# Patient Record
Sex: FEMALE | Race: WHITE | Employment: FULL TIME | URBAN - METROPOLITAN AREA
[De-identification: names, ages, dates, MRNs, and addresses within clinical notes are randomized per-mention and may not be internally consistent; named-entity substitution may affect disease eponyms.]

---

## 2018-11-15 ENCOUNTER — HOSPITAL ENCOUNTER (EMERGENCY)
Age: 48
Discharge: HOME | End: 2018-11-15
Payer: COMMERCIAL

## 2018-11-15 VITALS
HEART RATE: 74 BPM | HEIGHT: 66 IN | RESPIRATION RATE: 16 BRPM | OXYGEN SATURATION: 99 % | TEMPERATURE: 97.7 F | SYSTOLIC BLOOD PRESSURE: 115 MMHG | BODY MASS INDEX: 36.96 KG/M2 | WEIGHT: 230 LBS | DIASTOLIC BLOOD PRESSURE: 72 MMHG

## 2018-11-15 DIAGNOSIS — S86.812A STRAIN OF CALF MUSCLE, LEFT, INITIAL ENCOUNTER: Primary | ICD-10-CM

## 2018-11-15 PROCEDURE — 93971 EXTREMITY STUDY: CPT

## 2018-11-15 PROCEDURE — 99284 EMERGENCY DEPT VISIT MOD MDM: CPT

## 2018-11-15 RX ORDER — PREDNISONE 20 MG/1
40 TABLET ORAL DAILY
Qty: 10 TABLET | Refills: 0 | Status: SHIPPED | OUTPATIENT
Start: 2018-11-15 | End: 2018-11-20

## 2018-11-15 ASSESSMENT — PAIN SCALES - GENERAL
PAINLEVEL_OUTOF10: 6
PAINLEVEL_OUTOF10: 3

## 2018-11-15 NOTE — ED NOTES
Pt scripts x1 instructed to follow up with PCP. Assessed per Mat Pro PA.      Reji Jensen LPN  [id number]

## 2018-11-15 NOTE — ED PROVIDER NOTES
needed for Sleep Px by Dr. Simón Nielson LORazepam (ATIVAN) 0.5 MG tablet Take 0.5 mg by mouth 2 times daily as needed for Anxiety.  albuterol (PROAIR HFA) 108 (90 BASE) MCG/ACT inhaler Inhale 2 puffs into the lungs every 4 hours as needed for Wheezing 1 Inhaler 3     Allergies   Allergen Reactions    Acetaminophen Other (See Comments)     \"makes me jumpy\"    Amoxicillin Hives    Ibuprofen Hives       REVIEW OF SYSTEMS  10 systems reviewed, pertinent positives per HPI otherwise noted to be negative    PHYSICAL EXAM  /72   Pulse 74   Temp 97.7 °F (36.5 °C) (Oral)   Resp 16   Ht 5' 6\" (1.676 m)   Wt 230 lb (104.3 kg)   LMP 04/13/2015   SpO2 99%   BMI 37.12 kg/m²   GENERAL APPEARANCE: Awake and alert. Cooperative. No acute distress. HEAD: Normocephalic. Atraumatic. EYES: PERRL. EOM's grossly intact. ENT: Mucous membranes are moist.   NECK: Supple. HEART: RRR. No murmurs. LUNGS: Respirations unlabored. CTAB. Good air exchange. Speaking comfortably in full sentences. ABDOMEN: Soft. Non-distended. Non-tender. No guarding or rebound. EXTREMITIES: No peripheral edema. Patient exhibits left calf tenderness without palpable cords or masses. No unilateral swelling. No redness or warmth. Normal range of motion and strength testing. Pedal pulses +2 and cap refill less than 5 seconds. Moves all extremities equally. All extremities neurovascularly intact. SKIN: Warm and dry. No acute rashes. NEUROLOGICAL: Alert and oriented. CN's 2-12 intact. No gross facial drooping. Strength 5/5, sensation intact. PSYCHIATRIC: Normal mood and affect.     RADIOLOGY  Vl Extremity Venous Left    Result Date: 11/15/2018  Vascular Lower Extremities DVT Study Procedure -- PRELIMINARY SONOGRAPHER REPORT --   Demographics   Patient Name      Dulce BARRIOS   Date of Study     11/15/2018          Gender              Female   Patient Number    [id number]          Date of Birth       1970   Visit Catia Andrew and I have discussed the diagnosis and risks, and we agree with discharging home to follow-up with their primary doctor or the referral orthopedist. We also discussed returning to the Emergency Department immediately if new or worsening symptoms occur. We have discussed the symptoms which are most concerning (e.g., changing or worsening pain, numbness, weakness) that necessitate immediate return. Final Impression  1. Strain of calf muscle, left, initial encounter      Blood pressure 115/72, pulse 74, temperature 97.7 °F (36.5 °C), temperature source Oral, resp. rate 16, height 5' 6\" (1.676 m), weight 230 lb (104.3 kg), last menstrual period 04/13/2015, SpO2 99 %. DISPOSITION  Patient was discharged to home in good condition.           Jesus Salas  11/15/18 [id number]

## 2018-12-01 ENCOUNTER — OFFICE VISIT (OUTPATIENT)
Dept: ORTHOPEDIC SURGERY | Age: 48
End: 2018-12-01
Payer: COMMERCIAL

## 2018-12-01 VITALS — BODY MASS INDEX: 36.96 KG/M2 | WEIGHT: 230 LBS | HEIGHT: 66 IN

## 2018-12-01 DIAGNOSIS — M79.605 LEG PAIN, LEFT: Primary | ICD-10-CM

## 2018-12-01 DIAGNOSIS — S86.812A STRAIN OF CALF MUSCLE, LEFT, INITIAL ENCOUNTER: ICD-10-CM

## 2018-12-01 PROCEDURE — 99203 OFFICE O/P NEW LOW 30 MIN: CPT | Performed by: NURSE PRACTITIONER

## 2018-12-01 RX ORDER — METHYLPREDNISOLONE 4 MG/1
TABLET ORAL
Qty: 1 KIT | Refills: 0 | Status: SHIPPED | OUTPATIENT
Start: 2018-12-01 | End: 2018-12-07

## 2018-12-01 NOTE — PROGRESS NOTES
media tab  Past Medical History:   Diagnosis Date    Allergic rhinitis     Anxiety     Depression     GERD (gastroesophageal reflux disease)     Hiatal hernia       Past Surgical History:     History reviewed. No pertinent surgical history. Current Medications:     Current Outpatient Prescriptions:     zolpidem (AMBIEN) 10 MG tablet, Take 5 mg by mouth nightly as needed for Sleep Px by Dr. Vaishali Argueta, Disp: , Rfl:     albuterol (PROAIR HFA) 108 (90 BASE) MCG/ACT inhaler, Inhale 2 puffs into the lungs every 4 hours as needed for Wheezing, Disp: 1 Inhaler, Rfl: 3    LORazepam (ATIVAN) 0.5 MG tablet, Take 0.5 mg by mouth 2 times daily as needed for Anxiety. , Disp: , Rfl:   Allergies:  Acetaminophen; Amoxicillin; and Ibuprofen  Social History:    reports that she quit smoking about 18 years ago. She has never used smokeless tobacco. She reports that she does not drink alcohol or use drugs. Family History:   History reviewed. No pertinent family history. REVIEW OF SYSTEMS:   For new problems, a full review of systems will be found scanned in the patient's chart. CONSTITUTIONAL: Denies unexplained weight loss, fevers, chills   NEUROLOGICAL: Denies unsteady gait or progressive weakness  SKIN: Denies skin changes, delayed healing, rash, itching       PHYSICAL EXAM:    Vitals: Height 5' 6\" (1.676 m), weight 230 lb (104.3 kg), last menstrual period 04/13/2015. GENERAL EXAM:  · General Apparence: Patient is adequately groomed with no evidence of malnutrition. · Orientation: The patient is oriented to time, place and person. · Mood & Affect:The patient's mood and affect are appropriate       Left calf PHYSICAL EXAMINATION:  · Inspection:  No visual deformity. No erythema, ecchymosis or effusion. · Palpation:  Tenderness palpation mid calf around gastroc muscle. · Range of Motion: Left knee full range of motion with crepitance. · Strength: no strength deficits    · Special Tests:  Negative Homans.

## 2019-04-30 ENCOUNTER — OFFICE VISIT (OUTPATIENT)
Dept: FAMILY MEDICINE CLINIC | Age: 49
End: 2019-04-30
Payer: COMMERCIAL

## 2019-04-30 VITALS
BODY MASS INDEX: 40.02 KG/M2 | OXYGEN SATURATION: 98 % | HEART RATE: 83 BPM | HEIGHT: 66 IN | SYSTOLIC BLOOD PRESSURE: 110 MMHG | WEIGHT: 249 LBS | DIASTOLIC BLOOD PRESSURE: 80 MMHG

## 2019-04-30 DIAGNOSIS — F33.1 MODERATE EPISODE OF RECURRENT MAJOR DEPRESSIVE DISORDER (HCC): Primary | ICD-10-CM

## 2019-04-30 DIAGNOSIS — Z76.89 ENCOUNTER TO ESTABLISH CARE: ICD-10-CM

## 2019-04-30 DIAGNOSIS — F41.1 GAD (GENERALIZED ANXIETY DISORDER): ICD-10-CM

## 2019-04-30 DIAGNOSIS — G47.00 INSOMNIA, UNSPECIFIED TYPE: ICD-10-CM

## 2019-04-30 PROCEDURE — 99203 OFFICE O/P NEW LOW 30 MIN: CPT | Performed by: PHYSICIAN ASSISTANT

## 2019-04-30 RX ORDER — DULOXETIN HYDROCHLORIDE 30 MG/1
CAPSULE, DELAYED RELEASE ORAL
Refills: 0 | COMMUNITY
Start: 2019-04-23 | End: 2020-05-26

## 2019-04-30 ASSESSMENT — ENCOUNTER SYMPTOMS: SHORTNESS OF BREATH: 0

## 2019-04-30 NOTE — PROGRESS NOTES
2019    Gadiel Ernandez (:  1970) is a 50 y.o. female, here for evaluation of the following medical concerns:    HPI  Mood Disorder:  Patient presents for follow-up of depression and anxiety disorder. Current complaints include: anhedonia, feelings of hopelessness, feelings of worthlessness/excessive guilt, hypersomnia, fatigue, excessive worry and restlessness. She denies any other symptoms. Symptoms/signs of ankur: none. External stressors: nothing new. Current treatment includes: Cymbalta- 30 mg, benzodiazepine- ativan 0.5mg BID and sleep aid- Ambien 10 mg. Medication side effects: none. Would like a therapist.     Exercise: walks regularly  Diet: low caffeine consumption  Post-menopausal   has health screening. Tdap- had fever with the last one      Review of Systems   Constitutional: Positive for fatigue. Negative for activity change, appetite change, diaphoresis and unexpected weight change. Eyes: Negative for visual disturbance. Respiratory: Negative for shortness of breath. Cardiovascular: Negative for chest pain and palpitations. Skin: Negative for pallor. Neurological: Negative for dizziness, weakness, light-headedness and headaches. Hematological: Negative for adenopathy. Psychiatric/Behavioral: Positive for decreased concentration, dysphoric mood and sleep disturbance. Negative for agitation, behavioral problems, confusion, hallucinations, self-injury and suicidal ideas. The patient is nervous/anxious. The patient is not hyperactive. Prior to Visit Medications    Medication Sig Taking? Authorizing Provider   DULoxetine (CYMBALTA) 30 MG extended release capsule TAKE 1 CAPSULE BY MOUTH TWO TIMES A DAY Yes Historical Provider, MD   zolpidem (AMBIEN) 10 MG tablet Take 5 mg by mouth nightly as needed for Sleep Px by Dr. Ananya Kenney Yes Historical Provider, MD   LORazepam (ATIVAN) 0.5 MG tablet Take 0.5 mg by mouth 2 times daily as needed for Anxiety.  Yes Vitals:    04/30/19 1654   BP: 110/80   Site: Left Upper Arm   Position: Sitting   Cuff Size: Large Adult   Pulse: 83   SpO2: 98%   Weight: 249 lb (112.9 kg)   Height: 5' 6\" (1.676 m)     Estimated body mass index is 40.19 kg/m² as calculated from the following:    Height as of this encounter: 5' 6\" (1.676 m). Weight as of this encounter: 249 lb (112.9 kg). Physical Exam   Constitutional: She is oriented to person, place, and time. She appears well-developed and well-nourished. HENT:   Head: Normocephalic and atraumatic. Mouth/Throat: Oropharynx is clear and moist.   Cardiovascular: Normal rate, regular rhythm and normal heart sounds. Pulmonary/Chest: Effort normal and breath sounds normal.   Neurological: She is alert and oriented to person, place, and time. Skin: No pallor. Psychiatric: Her speech is normal and behavior is normal. Judgment and thought content normal. Her mood appears anxious. Cognition and memory are normal.   Vitals reviewed. ASSESSMENT/PLAN:  1. Moderate episode of recurrent major depressive disorder (HCC)  -  Continue with Cymbalta and ativan. Pt seen by Dr. Joselo Boyer  -  Follow up with [facility]    2. KAREN (generalized anxiety disorder)  -  See above. 3. Insomnia, unspecified type  -  Ambien managed by Dr. Joselo Boeyr. Follow up with [facility]. Return for August for gynecological exam/ schedule with [facility] . An  electronic signature was used to authenticate this note.     --VU Villalobos on 4/30/2019 at 5:22 PM

## 2019-05-22 ENCOUNTER — OFFICE VISIT (OUTPATIENT)
Dept: PSYCHOLOGY | Age: 49
End: 2019-05-22
Payer: COMMERCIAL

## 2019-05-22 DIAGNOSIS — F51.01 PRIMARY INSOMNIA: ICD-10-CM

## 2019-05-22 DIAGNOSIS — F33.1 MODERATE EPISODE OF RECURRENT MAJOR DEPRESSIVE DISORDER (HCC): Primary | ICD-10-CM

## 2019-05-22 DIAGNOSIS — F41.1 GAD (GENERALIZED ANXIETY DISORDER): ICD-10-CM

## 2019-05-22 PROCEDURE — 90791 PSYCH DIAGNOSTIC EVALUATION: CPT | Performed by: PSYCHOLOGIST

## 2019-05-22 ASSESSMENT — PATIENT HEALTH QUESTIONNAIRE - PHQ9
8. MOVING OR SPEAKING SO SLOWLY THAT OTHER PEOPLE COULD HAVE NOTICED. OR THE OPPOSITE, BEING SO FIGETY OR RESTLESS THAT YOU HAVE BEEN MOVING AROUND A LOT MORE THAN USUAL: 1
1. LITTLE INTEREST OR PLEASURE IN DOING THINGS: 1
9. THOUGHTS THAT YOU WOULD BE BETTER OFF DEAD, OR OF HURTING YOURSELF: 0
3. TROUBLE FALLING OR STAYING ASLEEP: 2
6. FEELING BAD ABOUT YOURSELF - OR THAT YOU ARE A FAILURE OR HAVE LET YOURSELF OR YOUR FAMILY DOWN: 1
10. IF YOU CHECKED OFF ANY PROBLEMS, HOW DIFFICULT HAVE THESE PROBLEMS MADE IT FOR YOU TO DO YOUR WORK, TAKE CARE OF THINGS AT HOME, OR GET ALONG WITH OTHER PEOPLE: 1
4. FEELING TIRED OR HAVING LITTLE ENERGY: 1
2. FEELING DOWN, DEPRESSED OR HOPELESS: 1
SUM OF ALL RESPONSES TO PHQ QUESTIONS 1-9: 9
5. POOR APPETITE OR OVEREATING: 1
SUM OF ALL RESPONSES TO PHQ QUESTIONS 1-9: 9
SUM OF ALL RESPONSES TO PHQ9 QUESTIONS 1 & 2: 2
7. TROUBLE CONCENTRATING ON THINGS, SUCH AS READING THE NEWSPAPER OR WATCHING TELEVISION: 1

## 2019-05-22 ASSESSMENT — ANXIETY QUESTIONNAIRES
5. BEING SO RESTLESS THAT IT IS HARD TO SIT STILL: 0-NOT AT ALL SURE
3. WORRYING TOO MUCH ABOUT DIFFERENT THINGS: 1-SEVERAL DAYS
4. TROUBLE RELAXING: 0-NOT AT ALL SURE
2. NOT BEING ABLE TO STOP OR CONTROL WORRYING: 1-SEVERAL DAYS
6. BECOMING EASILY ANNOYED OR IRRITABLE: 1-SEVERAL DAYS
GAD7 TOTAL SCORE: 4
1. FEELING NERVOUS, ANXIOUS, OR ON EDGE: 1-SEVERAL DAYS
7. FEELING AFRAID AS IF SOMETHING AWFUL MIGHT HAPPEN: 0-NOT AT ALL SURE

## 2019-05-22 NOTE — Clinical Note
She currently sees Dr. Jackson Elmore and pays out of pocket. We discussed seeing a psychiatrist covered by insurance vs having you maintain her meds. Do you have a preference?

## 2019-05-22 NOTE — PROGRESS NOTES
Behavioral Health Consultation  [address] Robertson PsyD  Psychologist  5/22/2019  8:06 AM      Time spent with Patient: 30 minutes  This is patient's first [facility] appointment. Reason for Consult:  Depression, anxiety, insomnia  Referring Provider: VU Berriosjose david Roman [phone]  Code for [place], [address]    Pt provided informed consent for the behavioral health program. Discussed with patient model of service to include the limits of confidentiality (i.e. abuse reporting, suicide intervention, etc.) and short-term intervention focused approach. Pt indicated understanding. Feedback given to PCP. S:  Has needed to be on antidepressants for years for depression and gets anxiety too. Was on Viibryrd for 5 years then recently switched. Sees a psychiatrist but is out of pocket cost. Wants to possibly switch. Since April 18 - has been on medical leave. Scheduled to go back to work Friday and feels ready. Dr. Denise Kam is her psychiatrist. Has been seeing her for 5 years. Does accounting work. Cymbalta has been going \"ok. \" Feels better being on it vs nothing when got off Viibryd. Has tried prozac in the past.     Goals to be less short fused. Gets easily irritable. Also wants to also learn to manage her time better. Puts too much on her plate at home and if doesn't get it done gets anxious. Also bored at work so wants to figure out how to get through her day. Stays for the good benefits but over the past year hasn't felt challenged or interested in work. Some changes with leadership so she now has less leadership. Also a coworker goes above and beyond and leaves her little to do. Thinks she may want to make a change, but is scared. Takes Ambien for sleep. Takes Ativan \"sometimes\" for sleep but not during the day.      O:  MSE:    Appearance    alert, cooperative  Sleep disturbance Yes  Fatigue Yes  Loss of pleasure Yes  Impulsive behavior No  Speech    normal rate and normal volume  Mood \"anxious\"  Affect     Congruent to thought content and mood; tearful at times  Thought Content    intact  Thought Process    linear and goal directed  Associations    logical connections  Insight    Good  Judgment    Intact  Orientation    oriented to person, place, time, and general circumstances  Memory    recent and remote memory intact  Attention/Concentration    intact  Ability to understand instructions Yes  Ability to respond meaningfully Yes  Suicide Assessment    Denies SI      History:    Medications:   Current Outpatient Medications   Medication Sig Dispense Refill    DULoxetine (CYMBALTA) 30 MG extended release capsule TAKE 1 CAPSULE BY MOUTH TWO TIMES A DAY  0    zolpidem (AMBIEN) 10 MG tablet Take 5 mg by mouth nightly as needed for Sleep Px by Dr. Odalys Marie      LORazepam (ATIVAN) 0.5 MG tablet Take 0.5 mg by mouth 2 times daily as needed for Anxiety. No current facility-administered medications for this visit.         Social History:   Social History     Socioeconomic History    Marital status:      Spouse name: Not on file    Number of children: Not on file    Years of education: Not on file    Highest education level: Not on file   Occupational History    Not on file   Social Needs    Financial resource strain: Not on file    Food insecurity:     Worry: Not on file     Inability: Not on file    Transportation needs:     Medical: Not on file     Non-medical: Not on file   Tobacco Use    Smoking status: Former Smoker     Last attempt to quit: 2000     Years since quittin.4    Smokeless tobacco: Never Used   Substance and Sexual Activity    Alcohol use: No    Drug use: No    Sexual activity: Yes     Partners: Male   Lifestyle    Physical activity:     Days per week: Not on file     Minutes per session: Not on file    Stress: Not on file   Relationships    Social connections:     Talks on phone: Not on file     Gets together: Not on file     Attends Advent service: Not on file     Active member of club or organization: Not on file     Attends meetings of clubs or organizations: Not on file     Relationship status: Not on file    Intimate partner violence:     Fear of current or ex partner: Not on file     Emotionally abused: Not on file     Physically abused: Not on file     Forced sexual activity: Not on file   Other Topics Concern    Not on file   Social History Narrative    Not on file       TOBACCO:   reports that she quit smoking about 19 years ago. She has never used smokeless tobacco.  ETOH:   reports that she does not drink alcohol. Family History:   Family History   Problem Relation Age of Onset    Cancer Mother         cervical cancer         A:  Talya Wells has been struggling with anxiety and depression for the past few years and has been working with a psychiatrist, Dr. Bonita Zeng fo medication management. She is interested in therapy to more effectively manage her mood. She was active and engaged and responded well to behavioral interventions. PHQ Scores 5/22/2019   PHQ2 Score 2   PHQ9 Score 9     Interpretation of Total Score Depression Severity: 1-4 = Minimal depression, 5-9 = Mild depression, 10-14 = Moderate depression, 15-19 = Moderately severe depression, 20-27 = Severe depression    KAREN 7 SCORE 5/22/2019   KAREN-7 Total Score 4     Interpretation of KAREN-7 score: 5-9 = mild anxiety, 10-14 = moderate anxiety, 15+ = severe anxiety. Recommend referral to behavioral health for scores 10 or greater.       Diagnosis:    KAREN  MDD, Recurrent, Moderate  Insomnia    Plan:  Pt interventions:  Established rapport, Conducted functional assessment, Trevor-setting to identify pt's primary goals for [facility] visit / overall health, Supportive techniques, Emphasized self-care as important for managing overall health and Provided Psychoeducation re: depression, anxiety, treatment available, insomnia, treatment available, role of medication in treatment   ACT interventions focused on values, provided handout on improving sleep through behavior change, treatment planning    Pt Behavioral Change Plan:  Pt set goals to 1) think about your goals and prioritize for next visit 2) review handout with behavioral changes for improved sleep 3) return for f/u in 2 weeks

## 2019-05-22 NOTE — PATIENT INSTRUCTIONS
Improving Sleep Through Behavior Change     Stimulus Control Procedures     Go to Bed Only When You Are Sleepy   The longer you are in bed awake, the more the bed is associated with a place to be awake instead of asleep. Delay bedtime until sleepy. Don't get into bed just because it's \"bedtime. \"    Get Out of Bed If You Cant Fall Asleep after 30 minutes OR  Get out of bed if you awaken during the night and can't fall back asleep after 20 minutes   Don't lie in bed trying to sleep. Instead, get out of bed and engage in a relaxing, quiet activity (e.g. Reading) until you feel DROWSY  then return to bed to attempt to sleep again. Use the Bed for Sleep and Sex Only   Do not watch TV, listen to the radio, eat, or read in your bed or bedroom. Sleep Hygiene Guidelines   Caffeine   Avoid caffeine 6 to 8 hours before bedtime. Caffeine disturbs sleep. Thus, drinking caffeinated beverages should be avoided near bedtime. Nicotine   Avoid nicotine before bedtime. Nicotine can keep you awake. Avoid tobacco near bedtime and during the night. Alcohol   Avoid alcohol after dinner. Alcohol often promotes the onset of sleep, but interrupts your natural sleep pattern. Do not consume it any closer than 4 hours before going to bed. Sleeping Pills   Sleep medications are effective only temporarily. Sleep medications lose their effectiveness in about 2 to 4 weeks when taken regularly. Over time, sleeping pills actually can make sleep problems worse; withdrawal from the medication can lead to an insomnia rebound. Keep use of sleeping pills infrequent, but dont worry if you need to use one on an occasional basis. Regular Exercise   Do not exercise within 3 hours of bedtime. Exercise within 3 hours of sleep may interfere with your ability to fall asleep due to body temperature changes. Baths/Showers  Baths can be a helpful relaxation activity. However, take the bath about 2 hours of bedtime.  This, too, can

## 2019-08-30 ENCOUNTER — OFFICE VISIT (OUTPATIENT)
Dept: FAMILY MEDICINE CLINIC | Age: 49
End: 2019-08-30
Payer: COMMERCIAL

## 2019-08-30 VITALS
WEIGHT: 252 LBS | DIASTOLIC BLOOD PRESSURE: 80 MMHG | SYSTOLIC BLOOD PRESSURE: 100 MMHG | OXYGEN SATURATION: 97 % | HEART RATE: 86 BPM | BODY MASS INDEX: 40.67 KG/M2

## 2019-08-30 DIAGNOSIS — N94.10 DYSPAREUNIA IN FEMALE: Primary | ICD-10-CM

## 2019-08-30 DIAGNOSIS — Z12.4 CERVICAL CANCER SCREENING: ICD-10-CM

## 2019-08-30 PROCEDURE — 99212 OFFICE O/P EST SF 10 MIN: CPT | Performed by: PHYSICIAN ASSISTANT

## 2019-08-31 LAB
CANDIDA SPECIES, DNA PROBE: NORMAL
GARDNERELLA VAGINALIS, DNA PROBE: NORMAL
TRICHOMONAS VAGINALIS DNA: NORMAL

## 2019-09-13 ENCOUNTER — OFFICE VISIT (OUTPATIENT)
Dept: OBGYN CLINIC | Age: 49
End: 2019-09-13
Payer: COMMERCIAL

## 2019-09-13 VITALS
DIASTOLIC BLOOD PRESSURE: 74 MMHG | WEIGHT: 253.6 LBS | HEIGHT: 65 IN | TEMPERATURE: 98 F | BODY MASS INDEX: 42.25 KG/M2 | SYSTOLIC BLOOD PRESSURE: 110 MMHG | HEART RATE: 69 BPM

## 2019-09-13 DIAGNOSIS — D68.52 PROTHROMBIN GENE MUTATION (HCC): ICD-10-CM

## 2019-09-13 DIAGNOSIS — N94.10 DYSPAREUNIA IN FEMALE: ICD-10-CM

## 2019-09-13 DIAGNOSIS — N95.0 POSTMENOPAUSAL POSTCOITAL BLEEDING: ICD-10-CM

## 2019-09-13 DIAGNOSIS — D68.51 HOMOZYGOUS FACTOR V LEIDEN MUTATION (HCC): ICD-10-CM

## 2019-09-13 DIAGNOSIS — Z12.4 PAP SMEAR FOR CERVICAL CANCER SCREENING: ICD-10-CM

## 2019-09-13 DIAGNOSIS — Z01.411 ENCNTR FOR GYN EXAM (GENERAL) (ROUTINE) W ABNORMAL FINDINGS: Primary | ICD-10-CM

## 2019-09-13 DIAGNOSIS — N95.2 VAGINAL ATROPHY: ICD-10-CM

## 2019-09-13 DIAGNOSIS — N93.0 POSTMENOPAUSAL POSTCOITAL BLEEDING: ICD-10-CM

## 2019-09-13 DIAGNOSIS — Z12.39 BREAST CANCER SCREENING: ICD-10-CM

## 2019-09-13 PROCEDURE — 99386 PREV VISIT NEW AGE 40-64: CPT | Performed by: OBSTETRICS & GYNECOLOGY

## 2019-09-13 NOTE — PROGRESS NOTES
Annual Exam      CC:   Chief Complaint   Patient presents with    Gynecologic Exam       HPI:  52 y.o. [id number] presents for her gynecologic annual exam.    Patient seen and examined. Patient is overall doing well. Patient was seen recently for her annual exam and they were unable to complete her pap smear secondary to non-visualization of her cervix. Patient reports last menses was 18 months ago. Denies bleeding since that time, however reports blood noted on her 's leg following intercourse. Reports intercourse has been painful for the last 3 months and was particularly painful at that time. Reports dryness and redness. Patient reports hot flashes daily, however feels that they are manageable. Patient reports breast exam with her PCP 2 weeks ago and declines today. In 2012 the patient had a blood clot in her right upper extremity. She was treated with blood thinners for 6 months. At that time testing was performed was positive for homozygous factor V Leiden and heterozygous for Prothrombin gene mutations. Health Maintenance:  Birth control: Post-menopausal / Vasectomy  Pregnancy plans: None  Safe relationship: Yes -  17 years    Screening:  Last pap smear: August 2014 - Normal   History of abnormal pap smears: Denies  Mammogram: February 2014 - normal, Denies hx of abnormal's     Review of Systems:   Review of Systems   Constitutional: Negative for chills and fever. HENT: Negative for congestion, sneezing and sore throat. Respiratory: Negative for cough and shortness of breath. Cardiovascular: Negative for chest pain and palpitations. Gastrointestinal: Negative for abdominal pain, constipation, diarrhea, nausea and vomiting. Endocrine:        Hot flashes   Genitourinary: Positive for dyspareunia, vaginal bleeding and vaginal pain. Negative for dysuria, frequency, menstrual problem, pelvic pain and vaginal discharge. Musculoskeletal: Negative for back pain. for gyn exam (general) (routine) w abnormal findings     - Pap smear collected today - will call with results     - Age based screening recommendations discussed     - Self breast exams/awareness discussed with the patient     - Healthy lifestyle habits discussed including calcium and vitamin D supplementation     - Will follow-up in 1 year for annual exam     2. Pap smear for cervical cancer screening     - Pap smear collected today - will call with results     - Age based screening recommendations discussed     - Will follow-up in 1 year for annual exam     3. Breast cancer screening     - Breast exam deferred today per patient request secondary to recent exam by PCP     - Age based screening recommendations discussed     - TODD DIGITAL SCREEN W OR WO CAD BILATERAL; Future    4. [facility]thrombin gene mutation [facility]     - Noted on review of records in [place]     - Patient reports she is not aware of this history     - No long-term anticoagulation     - Discussed mutation, inheritance and risks     - Will place referral for Hematology for patient counseling and recommendations for additional/family testing   - External Referral To Hematology Oncology    5. Homozygous Factor V Leiden mutation ([facility]     - Noted on review of records in [place]     - Patient reports she is not aware of this history     - No long-term anticoagulation     - Discussed mutation, inheritance and risks     - Will place referral for Hematology for patient counseling and recommendations for additional/family testing   - External Referral To Hematology Oncology    6. Vaginal atrophy     - Atrophy noted on exam with irritation upon speculum placement     - Risks, benefits and alternatives were discussed with the patient including hormonal and non-hormonal interventions     - Patient would like to try vaginal moisturizers first     - Will have back in 3 months if no improvement or in 1 year for annual exam    7.  Dyspareunia in female - Vaginal pathogens panel is negative     - Significant pain with friction and insertion     - Discussed vaginal moisturizers vs. Vaginal estrogen both with adequate lubrication     - Discussed adequate foreplay and slow start to prevent tissue trauma    8.  Postmenopausal postcoital bleeding     - Isolated incident of blood noted on her 's thigh following very painful intercourse     - Risks of postmenopausal bleeding discussed with the patient, including the risk for endometrial origin and risk of hyperplasia or malignancy     - Reviewed ultrasound for further evaluation of endometrial lining, however with isolated incident during painful intercourse and vaginal atrophy noted on exam likely source of bleeding.       - Patient instructed that if any further bleeding present will need ultrasound evaluation     - Patient will follow-up in 3 months or earlier if symptoms persist.        Jason Ma, DO

## 2019-09-13 NOTE — PATIENT INSTRUCTIONS
Rephresh & Replense - Vaginal Moisturizers    Silicone based lubricant    Calcium 1,100 mg daily  Vitamin D - 600 IU daily

## 2019-09-15 PROBLEM — D68.52 PROTHROMBIN GENE MUTATION (HCC): Status: ACTIVE | Noted: 2019-09-15

## 2019-09-15 PROBLEM — N94.10 DYSPAREUNIA IN FEMALE: Status: ACTIVE | Noted: 2019-09-15

## 2019-09-15 PROBLEM — N95.0 POSTMENOPAUSAL POSTCOITAL BLEEDING: Status: ACTIVE | Noted: 2019-09-15

## 2019-09-15 ASSESSMENT — ENCOUNTER SYMPTOMS
SHORTNESS OF BREATH: 0
NAUSEA: 0
VOMITING: 0
ABDOMINAL PAIN: 0
BACK PAIN: 0
DIARRHEA: 0
SORE THROAT: 0
CONSTIPATION: 0
COUGH: 0

## 2019-09-18 LAB
HPV COMMENT: NORMAL
HPV TYPE 16: NOT DETECTED
HPV TYPE 18: NOT DETECTED
HPVOH (OTHER TYPES): NOT DETECTED

## 2019-11-01 ENCOUNTER — OFFICE VISIT (OUTPATIENT)
Dept: FAMILY MEDICINE CLINIC | Age: 49
End: 2019-11-01
Payer: COMMERCIAL

## 2019-11-01 VITALS
WEIGHT: 257 LBS | BODY MASS INDEX: 42.82 KG/M2 | HEIGHT: 65 IN | HEART RATE: 64 BPM | DIASTOLIC BLOOD PRESSURE: 70 MMHG | SYSTOLIC BLOOD PRESSURE: 110 MMHG | TEMPERATURE: 97.9 F | RESPIRATION RATE: 16 BRPM | OXYGEN SATURATION: 98 %

## 2019-11-01 DIAGNOSIS — H66.004 RECURRENT ACUTE SUPPURATIVE OTITIS MEDIA OF RIGHT EAR WITHOUT SPONTANEOUS RUPTURE OF TYMPANIC MEMBRANE: Primary | ICD-10-CM

## 2019-11-01 PROCEDURE — 99213 OFFICE O/P EST LOW 20 MIN: CPT | Performed by: PHYSICIAN ASSISTANT

## 2019-11-01 RX ORDER — CEFDINIR 300 MG/1
300 CAPSULE ORAL 2 TIMES DAILY
Qty: 14 CAPSULE | Refills: 0 | Status: SHIPPED | OUTPATIENT
Start: 2019-11-01 | End: 2019-11-07

## 2019-11-01 RX ORDER — PREDNISONE 20 MG/1
TABLET ORAL
COMMUNITY
Start: 2019-10-29 | End: 2019-11-07

## 2019-11-01 RX ORDER — AMOXICILLIN AND CLAVULANATE POTASSIUM 875; 125 MG/1; MG/1
TABLET, FILM COATED ORAL
COMMUNITY
Start: 2019-10-29 | End: 2019-11-07

## 2019-11-01 ASSESSMENT — ENCOUNTER SYMPTOMS
SHORTNESS OF BREATH: 0
ABDOMINAL PAIN: 0
RHINORRHEA: 0
CONSTIPATION: 0
SORE THROAT: 0
NAUSEA: 0
VOMITING: 0
DIARRHEA: 0
COUGH: 0

## 2019-11-04 ENCOUNTER — OFFICE VISIT (OUTPATIENT)
Dept: ENT CLINIC | Age: 49
End: 2019-11-04
Payer: COMMERCIAL

## 2019-11-04 VITALS
HEART RATE: 81 BPM | HEIGHT: 66 IN | SYSTOLIC BLOOD PRESSURE: 126 MMHG | WEIGHT: 253 LBS | DIASTOLIC BLOOD PRESSURE: 81 MMHG | BODY MASS INDEX: 40.66 KG/M2

## 2019-11-04 DIAGNOSIS — H91.8X1 OTHER HEARING LOSS OF RIGHT EAR WITH UNRESTRICTED HEARING OF LEFT EAR: ICD-10-CM

## 2019-11-04 DIAGNOSIS — H69.81 ETD (EUSTACHIAN TUBE DYSFUNCTION), RIGHT: Primary | ICD-10-CM

## 2019-11-04 PROCEDURE — 99203 OFFICE O/P NEW LOW 30 MIN: CPT | Performed by: OTOLARYNGOLOGY

## 2019-11-04 ASSESSMENT — ENCOUNTER SYMPTOMS
EYE ITCHING: 0
FACIAL SWELLING: 0
SHORTNESS OF BREATH: 0
APNEA: 0
SINUS PRESSURE: 0
SORE THROAT: 0
COUGH: 0
VOICE CHANGE: 0
TROUBLE SWALLOWING: 0

## 2019-11-07 ENCOUNTER — OFFICE VISIT (OUTPATIENT)
Dept: ENT CLINIC | Age: 49
End: 2019-11-07
Payer: COMMERCIAL

## 2019-11-07 VITALS
TEMPERATURE: 98.5 F | WEIGHT: 256.4 LBS | HEART RATE: 90 BPM | SYSTOLIC BLOOD PRESSURE: 136 MMHG | BODY MASS INDEX: 41.21 KG/M2 | HEIGHT: 66 IN | DIASTOLIC BLOOD PRESSURE: 78 MMHG

## 2019-11-07 DIAGNOSIS — H69.81 ETD (EUSTACHIAN TUBE DYSFUNCTION), RIGHT: ICD-10-CM

## 2019-11-07 DIAGNOSIS — H91.91 HEARING LOSS OF RIGHT EAR, UNSPECIFIED HEARING LOSS TYPE: ICD-10-CM

## 2019-11-07 PROCEDURE — 99203 OFFICE O/P NEW LOW 30 MIN: CPT | Performed by: OTOLARYNGOLOGY

## 2019-11-07 RX ORDER — AZELASTINE 1 MG/ML
1 SPRAY, METERED NASAL EVERY EVENING
Qty: 1 BOTTLE | Refills: 1 | Status: SHIPPED | OUTPATIENT
Start: 2019-11-07 | End: 2019-12-07

## 2019-11-07 RX ORDER — DIPHENHYDRAMINE HCL 25 MG
25 CAPSULE ORAL EVERY 6 HOURS PRN
COMMUNITY

## 2019-11-07 RX ORDER — OXYMETAZOLINE HYDROCHLORIDE 0.05 G/100ML
2 SPRAY NASAL 2 TIMES DAILY
Qty: 1 BOTTLE | Refills: 0 | Status: SHIPPED | OUTPATIENT
Start: 2019-11-07 | End: 2020-11-06

## 2019-11-18 ENCOUNTER — OFFICE VISIT (OUTPATIENT)
Dept: ENT CLINIC | Age: 49
End: 2019-11-18
Payer: COMMERCIAL

## 2019-11-18 VITALS
HEART RATE: 102 BPM | WEIGHT: 255.4 LBS | BODY MASS INDEX: 41.05 KG/M2 | TEMPERATURE: 96.4 F | DIASTOLIC BLOOD PRESSURE: 74 MMHG | HEIGHT: 66 IN | SYSTOLIC BLOOD PRESSURE: 127 MMHG

## 2019-11-18 DIAGNOSIS — H69.81 ETD (EUSTACHIAN TUBE DYSFUNCTION), RIGHT: Primary | ICD-10-CM

## 2019-11-18 PROCEDURE — 99213 OFFICE O/P EST LOW 20 MIN: CPT | Performed by: OTOLARYNGOLOGY

## 2020-05-15 ENCOUNTER — TELEPHONE (OUTPATIENT)
Dept: FAMILY MEDICINE CLINIC | Age: 50
End: 2020-05-15

## 2020-05-26 ENCOUNTER — VIRTUAL VISIT (OUTPATIENT)
Dept: FAMILY MEDICINE CLINIC | Age: 50
End: 2020-05-26
Payer: COMMERCIAL

## 2020-05-26 PROCEDURE — 99213 OFFICE O/P EST LOW 20 MIN: CPT | Performed by: PHYSICIAN ASSISTANT

## 2020-05-26 RX ORDER — DULOXETIN HYDROCHLORIDE 20 MG/1
20 CAPSULE, DELAYED RELEASE ORAL DAILY
Qty: 30 CAPSULE | Refills: 3 | Status: SHIPPED | OUTPATIENT
Start: 2020-05-26 | End: 2020-11-02 | Stop reason: SDUPTHER

## 2020-05-26 ASSESSMENT — ENCOUNTER SYMPTOMS: SHORTNESS OF BREATH: 0

## 2020-05-26 NOTE — PROGRESS NOTES
NASAL SPRAY) 0.05 % nasal spray 2 sprays by Nasal route 2 times daily Yes Abdelrahman Oconnor MD   zolpidem (AMBIEN) 10 MG tablet Take 5 mg by mouth nightly as needed for Sleep Px by Dr. Evelia Levin Yes Historical Provider, MD   azelastine (ASTELIN) 0.1 % nasal spray 1 spray by Nasal route every evening Use in each nostril as directed  Abdelrahman Oconnor MD       Social History     Tobacco Use    Smoking status: Former Smoker     Last attempt to quit: 2000     Years since quittin.4    Smokeless tobacco: Never Used   Substance Use Topics    Alcohol use: Yes     Comment: rarely    Drug use: No        Allergies   Allergen Reactions    Acetaminophen Other (See Comments)     \"makes me jumpy\"    Amoxicillin Hives    Ibuprofen Hives   ,   Past Medical History:   Diagnosis Date    Allergic rhinitis     Anxiety     Arthritis     Depression     Dizziness     GERD (gastroesophageal reflux disease)     Headache     Hearing loss     Hiatal hernia     Menopausal symptoms     Tinnitus    ,   Past Surgical History:   Procedure Laterality Date    UPPER GASTROINTESTINAL ENDOSCOPY     ,   Social History     Tobacco Use    Smoking status: Former Smoker     Last attempt to quit: 2000     Years since quittin.4    Smokeless tobacco: Never Used   Substance Use Topics    Alcohol use: Yes     Comment: rarely    Drug use: No       PHYSICAL EXAMINATION:  [ INSTRUCTIONS:  \"[x]\" Indicates a positive item  \"[]\" Indicates a negative item  -- DELETE ALL ITEMS NOT EXAMINED]  Vital Signs: (As obtained by patient/caregiver or practitioner observation)    Blood pressure-  Heart rate-    Respiratory rate- 14   Temperature-  Pulse oximetry-     Constitutional: [x] Appears well-developed and well-nourished [x] No apparent distress      [] Abnormal-   Mental status  [x] Alert and awake  [x] Oriented to person/place/time [x]Able to follow commands      Eyes:  EOM    []  Normal  [] Abnormal-  Sclera  []  Normal  [] Abnormal - Emergencies Act, 1135 waiver authority and the Coronavirus Preparedness and Response Supplemental Appropriations Act, this Virtual Visit was conducted with patient's (and/or legal guardian's) consent, to reduce the patient's risk of exposure to COVID-19 and provide necessary medical care. The patient (and/or legal guardian) has also been advised to contact this office for worsening conditions or problems, and seek emergency medical treatment and/or call 911 if deemed necessary. Patient identification was verified at the start of the visit: Yes    Total time spent on this encounter: Not billed by time    Services were provided through a video synchronous discussion virtually to substitute for in-person clinic visit. Patient and provider were located at their individual homes. --VU Busch on 5/26/2020 at 12:11 PM    An electronic signature was used to authenticate this note.

## 2020-07-24 ENCOUNTER — NURSE TRIAGE (OUTPATIENT)
Dept: OTHER | Facility: CLINIC | Age: 50
End: 2020-07-24

## 2020-07-24 ENCOUNTER — TELEPHONE (OUTPATIENT)
Dept: FAMILY MEDICINE CLINIC | Age: 50
End: 2020-07-24

## 2020-07-24 ENCOUNTER — VIRTUAL VISIT (OUTPATIENT)
Dept: FAMILY MEDICINE CLINIC | Age: 50
End: 2020-07-24
Payer: COMMERCIAL

## 2020-07-24 PROCEDURE — 99442 PR PHYS/QHP TELEPHONE EVALUATION 11-20 MIN: CPT | Performed by: FAMILY MEDICINE

## 2020-07-24 RX ORDER — ALBUTEROL SULFATE 2.5 MG/3ML
2.5 SOLUTION RESPIRATORY (INHALATION) EVERY 6 HOURS PRN
Qty: 120 EACH | Refills: 5 | Status: SHIPPED | OUTPATIENT
Start: 2020-07-24

## 2020-07-24 RX ORDER — AZITHROMYCIN 250 MG/1
TABLET, FILM COATED ORAL
Qty: 1 PACKET | Refills: 0 | Status: SHIPPED | OUTPATIENT
Start: 2020-07-24 | End: 2021-08-12 | Stop reason: ALTCHOICE

## 2020-07-24 RX ORDER — ONDANSETRON 4 MG/1
4 TABLET, ORALLY DISINTEGRATING ORAL EVERY 8 HOURS PRN
Qty: 20 TABLET | Refills: 1 | Status: SHIPPED | OUTPATIENT
Start: 2020-07-24 | End: 2021-08-12 | Stop reason: ALTCHOICE

## 2020-07-24 RX ORDER — BENZONATATE 200 MG/1
200 CAPSULE ORAL 3 TIMES DAILY PRN
Qty: 30 CAPSULE | Refills: 1 | Status: SHIPPED | OUTPATIENT
Start: 2020-07-24 | End: 2021-08-12 | Stop reason: ALTCHOICE

## 2020-07-24 ASSESSMENT — ENCOUNTER SYMPTOMS
WHEEZING: 1
RHINORRHEA: 1
COUGH: 1

## 2020-07-24 NOTE — PROGRESS NOTES
 zolpidem (AMBIEN) 10 MG tablet Take 5 mg by mouth nightly as needed for Sleep Px by Dr. Bryant Bodily       No current facility-administered medications for this visit. Assessment:    1. Bronchitis        Plan:    1. Bronchitis  Symptoms present for over 10 days. She responds well to nebulizers. If no improvement in 1-2 days, try the Z-pack. - azithromycin (ZITHROMAX) 250 MG tablet; Take 2 tabs (500 mg) on Day 1, and take 1 tab (250 mg) on days 2 through 5. Dispense: 1 packet; Refill: 0  - benzonatate (TESSALON) 200 MG capsule; Take 1 capsule by mouth 3 times daily as needed for Cough  Dispense: 30 capsule; Refill: 1  - albuterol (PROVENTIL) (2.5 MG/3ML) 0.083% nebulizer solution; Take 3 mLs by nebulization every 6 hours as needed for Wheezing or Shortness of Breath (Dispense with 1 Nebulizer. Use as directed.)  Dispense: 120 each; Refill: 5  - ondansetron (ZOFRAN ODT) 4 MG disintegrating tablet; Take 1 tablet by mouth every 8 hours as needed for Nausea or Vomiting  Dispense: 20 tablet; Refill: 1    Servando Vital is a 52 y.o. female evaluated via telephone on 7/24/2020. Consent:  She and/or health care decision maker is aware that that she may receive a bill for this telephone service, depending on her insurance coverage, and has provided verbal consent to proceed: Yes      Documentation:  I communicated with the patient and/or health care decision maker about cough. Details of this discussion including any medical advice provided: discussed above      I affirm this is a Patient Initiated Episode with a Patient who has not had a related appointment within my department in the past 7 days or scheduled within the next 24 hours.     Patient identification was verified at the start of the visit: Yes    Total Time: minutes: 11-20 minutes    Note: not billable if this call serves to triage the patient into an appointment for the relevant concern      Dung Szymanski

## 2020-07-24 NOTE — TELEPHONE ENCOUNTER
Reason for Disposition   MILD difficulty breathing (e.g., minimal/no SOB at rest, SOB with walking, pulse <100)    Answer Assessment - Initial Assessment Questions  1. COVID-19 DIAGNOSIS: \"Who made your Coronavirus (COVID-19) diagnosis? \" \"Was it confirmed by a positive lab test?\" If not diagnosed by a HCP, ask \"Are there lots of cases (community spread) where you live? \" (See [facility] health department website, if unsure)      [facility]  2. ONSET: \"When did the COVID-19 symptoms start? \"       7/14/20202  3. WORST SYMPTOM: \"What is your worst symptom? \" (e.g., cough, fever, shortness of breath, muscle aches)      Cough and SOB  4. COUGH: \"Do you have a cough? \" If so, ask: \"How bad is the cough? \"        Yes, severe at times  5. FEVER: \"Do you have a fever? \" If so, ask: \"What is your temperature, how was it measured, and when did it start? \"      no  6. RESPIRATORY STATUS: \"Describe your breathing? \" (e.g., shortness of breath, wheezing, unable to speak)       SOB on exertion   7. BETTER-SAME-WORSE: Emmalene Leader you getting better, staying the same or getting worse compared to yesterday? \"  If getting worse, ask, \"In what way? \"      The cough is getting worse  8. HIGH RISK DISEASE: \"Do you have any chronic medical problems? \" (e.g., asthma, heart or lung disease, weak immune system, etc.)      no  9. PREGNANCY: \"Is there any chance you are pregnant? \" \"When was your last menstrual period? \"      n/a  10. OTHER SYMPTOMS: \"Do you have any other symptoms? \"  (e.g., chills, fatigue, headache, loss of smell or taste, muscle pain, sore throat)        Muscle pain, weakness, runny nose, nasal congestion, loss of taste, cough and SOB when laying down and on exertion. Protocols used: CORONAVIRUS (COVID-19) DIAGNOSED OR SUSPECTED-ADULT-    Caller is experiencing s/s 7/14/2020 they are muscle pain, weakness, runny nose, nasal congestion, loss of taste, cough and SOB and wheezing when laying down and on exertion.  Caller states she typically goes to [address] [place] [address] for a nebulizer treatment, but is unable to do that due to [place]. Caller does not have any chest pain at this time. SOB is mild and on exertion or has wheezing and SOB when she lays down at night to rest. Caller would like a treatment called in that she can  at the pharmacy. Caller stated her  and daughter have both been sick and her  tested negative for COVID. Recommendation Call PCP Now. Transferred to [facility] for an appointment.

## 2020-07-24 NOTE — TELEPHONE ENCOUNTER
----- Message from Opal Roberts sent at 7/24/2020 10:56 AM EDT -----  Subject: Message to Provider    QUESTIONS  Information for Provider? Patient is having wheezing & sob. States this   happens every year this time   But can not go to hospital for a nebulizer treatment. patient can do VV   or tele health appoinment. Nurse triage did advise the patient to be seen   today.   ---------------------------------------------------------------------------  --------------  [employer]  What is the best way for the office to contact you? OK to leave message on   voicemail  Preferred Call Back Phone Number? [phone]  ---------------------------------------------------------------------------  --------------  SCRIPT ANSWERS  Relationship to Patient?  Self

## 2020-11-02 RX ORDER — DULOXETIN HYDROCHLORIDE 20 MG/1
20 CAPSULE, DELAYED RELEASE ORAL DAILY
Qty: 90 CAPSULE | Refills: 1 | Status: SHIPPED | OUTPATIENT
Start: 2020-11-02 | End: 2021-04-20

## 2020-11-02 NOTE — TELEPHONE ENCOUNTER
----- Message from Romayne Cord sent at 11/2/2020 10:49 AM EST -----  Subject: Refill Request    QUESTIONS  Name of Medication? DULoxetine (CYMBALTA) 20 MG extended release capsule  Patient-reported dosage and instructions? 20 MG  How many days do you have left? 0  [facility]erred [facility]? [facility] phone number (if available)? [phone]  Additional Information for Provider?   ---------------------------------------------------------------------------  --------------  CALL BACK INFO  What is the best way for the office to contact you? OK to leave message on   voicemail  Preferred Call Back Phone Number?  [phone]

## 2020-11-02 NOTE — TELEPHONE ENCOUNTER
Refill Request     Last Seen: 7/24/2020    Last Written: #30  3rf  5/26/2020    Next Appointment:   No future appointments.           Requested Prescriptions     Pending Prescriptions Disp Refills    DULoxetine (CYMBALTA) 20 MG extended release capsule 30 capsule 3     Sig: Take 1 capsule by mouth daily

## 2021-04-20 RX ORDER — DULOXETIN HYDROCHLORIDE 20 MG/1
CAPSULE, DELAYED RELEASE ORAL
Qty: 90 CAPSULE | Refills: 0 | Status: SHIPPED | OUTPATIENT
Start: 2021-04-20 | End: 2021-07-23 | Stop reason: SDUPTHER

## 2021-04-20 NOTE — TELEPHONE ENCOUNTER
.  Last office visit 7/24/2020     Last written 11-2-2020 90 with 1      Next office visit scheduled Visit date not found    Requested Prescriptions     Pending Prescriptions Disp Refills    DULoxetine (CYMBALTA) 20 MG extended release capsule [Pharmacy Med Name: DULoxetine HCl Oral Capsule Delayed Release Particles 20 MG] 90 capsule 0     Sig: TAKE 1 CAPSULE BY MOUTH EVERY DAY
Please help to schedule
Pt needs appt scheduled for July for annual follow up
No complaints

## 2021-08-12 ENCOUNTER — OFFICE VISIT (OUTPATIENT)
Dept: FAMILY MEDICINE CLINIC | Age: 51
End: 2021-08-12
Payer: COMMERCIAL

## 2021-08-12 VITALS
WEIGHT: 270 LBS | BODY MASS INDEX: 43.39 KG/M2 | OXYGEN SATURATION: 99 % | HEART RATE: 92 BPM | DIASTOLIC BLOOD PRESSURE: 88 MMHG | SYSTOLIC BLOOD PRESSURE: 138 MMHG | HEIGHT: 66 IN

## 2021-08-12 DIAGNOSIS — Z12.11 COLON CANCER SCREENING: ICD-10-CM

## 2021-08-12 DIAGNOSIS — Z12.31 ENCOUNTER FOR SCREENING MAMMOGRAM FOR MALIGNANT NEOPLASM OF BREAST: ICD-10-CM

## 2021-08-12 DIAGNOSIS — Z00.00 PHYSICAL EXAM, ANNUAL: Primary | ICD-10-CM

## 2021-08-12 DIAGNOSIS — Z86.39 HISTORY OF VITAMIN D DEFICIENCY: ICD-10-CM

## 2021-08-12 PROCEDURE — 99396 PREV VISIT EST AGE 40-64: CPT | Performed by: PHYSICIAN ASSISTANT

## 2021-08-12 SDOH — ECONOMIC STABILITY: FOOD INSECURITY: WITHIN THE PAST 12 MONTHS, THE FOOD YOU BOUGHT JUST DIDN'T LAST AND YOU DIDN'T HAVE MONEY TO GET MORE.: NEVER TRUE

## 2021-08-12 SDOH — ECONOMIC STABILITY: FOOD INSECURITY: WITHIN THE PAST 12 MONTHS, YOU WORRIED THAT YOUR FOOD WOULD RUN OUT BEFORE YOU GOT MONEY TO BUY MORE.: NEVER TRUE

## 2021-08-12 ASSESSMENT — ENCOUNTER SYMPTOMS
SHORTNESS OF BREATH: 0
WHEEZING: 0
BLOOD IN STOOL: 0

## 2021-08-12 ASSESSMENT — SOCIAL DETERMINANTS OF HEALTH (SDOH): HOW HARD IS IT FOR YOU TO PAY FOR THE VERY BASICS LIKE FOOD, HOUSING, MEDICAL CARE, AND HEATING?: NOT HARD AT ALL

## 2021-08-12 NOTE — PROGRESS NOTES
CAPSULE BY MOUTH EVERY DAY Yes VU Anaya   albuterol (PROVENTIL) (2.5 MG/3ML) 0.083% nebulizer solution Take 3 mLs by nebulization every 6 hours as needed for Wheezing or Shortness of Breath (Dispense with 1 Nebulizer.  Use as directed.) Yes Melissa Benitez DO   Pseudoephedrine-guaiFENesin ([place] [address] D PO) Take by mouth Yes Historical Provider, MD   diphenhydrAMINE (BENADRYL) 25 MG capsule Take 25 mg by mouth every 6 hours as needed for Itching Yes Historical Provider, MD   azelastine (ASTELIN) 0.1 % nasal spray 1 spray by Nasal route every evening Use in each nostril as directed  Bing Doss MD        Allergies   Allergen Reactions    Acetaminophen Other (See Comments)     \"makes me jumpy\"    Amoxicillin Hives    Ibuprofen Hives       Past Medical History:   Diagnosis Date    Allergic rhinitis     Anxiety     Arthritis     Depression     Dizziness     GERD (gastroesophageal reflux disease)     Headache     Hearing loss     Hiatal hernia     Menopausal symptoms     Tinnitus        Past Surgical History:   Procedure Laterality Date    UPPER GASTROINTESTINAL ENDOSCOPY         Social History     Socioeconomic History    Marital status:      Spouse name: Not on file    Number of children: Not on file    Years of education: Not on file    Highest education level: Not on file   Occupational History    Not on file   Tobacco Use    Smoking status: Former Smoker     Quit date: 2000     Years since quittin.6    Smokeless tobacco: Never Used   Vaping Use    Vaping Use: Never used   Substance and Sexual Activity    Alcohol use: Yes     Comment: rarely    Drug use: No    Sexual activity: Yes     Partners: Male   Other Topics Concern    Not on file   Social History Narrative    Not on file     Social Determinants of Health     Financial Resource Strain: Low Risk     Difficulty of Paying Living Expenses: Not hard at all   Food Insecurity: No Food Insecurity    Worried About Running Out of Food in the Last Year: Never true    Ran Out of Food in the Last Year: Never true   Transportation Needs:     Lack of Transportation (Medical):  Lack of Transportation (Non-Medical):    Physical Activity:     Days of Exercise per Week:     Minutes of Exercise per Session:    Stress:     Feeling of Stress :    Social Connections:     Frequency of Communication with Friends and Family:     Frequency of Social Gatherings with Friends and Family:     Attends Judaism Services:     Active Member of Clubs or Organizations:     Attends Club or Organization Meetings:     Marital Status:    Intimate Partner Violence:     Fear of Current or Ex-Partner:     Emotionally Abused:     Physically Abused:     Sexually Abused:         Family History   Problem Relation Age of Onset    Cancer Mother         cervical cancer    No Known Problems Father     No Known Problems Paternal Grandfather     No Known Problems Paternal Grandmother     No Known Problems Maternal Grandmother     No Known Problems Maternal Grandfather        ADVANCE DIRECTIVE: N, <no information>    Vitals:    08/12/21 0837   BP: 138/88   Pulse: 92   SpO2: 99%   Weight: 270 lb (122.5 kg)   Height: 5' 5.5\" (1.664 m)     Estimated body mass index is 44.25 kg/m² as calculated from the following:    Height as of this encounter: 5' 5.5\" (1.664 m). Weight as of this encounter: 270 lb (122.5 kg). Physical Exam  Vitals reviewed. Constitutional:       Appearance: Normal appearance. She is obese. HENT:      Head: Normocephalic and atraumatic. Eyes:      Conjunctiva/sclera: Conjunctivae normal.   Neck:      Thyroid: No thyromegaly or thyroid tenderness. Cardiovascular:      Rate and Rhythm: Normal rate and regular rhythm. Heart sounds: Normal heart sounds. Pulmonary:      Effort: Pulmonary effort is normal.      Breath sounds: Normal breath sounds.    Abdominal:      General: Bowel sounds are normal.      Palpations: follow up. An electronic signature was used to authenticate this note.     --VU Christina on 8/12/2021 at 9:02 AM

## 2022-09-13 RX ORDER — DULOXETIN HYDROCHLORIDE 20 MG/1
CAPSULE, DELAYED RELEASE ORAL
Qty: 90 CAPSULE | Refills: 1 | Status: SHIPPED | OUTPATIENT
Start: 2022-09-13

## 2022-09-13 NOTE — TELEPHONE ENCOUNTER
.Refill Request     CONFIRM preferrred pharmacy with the patient. If Mail Order Rx - Pend for 90 day refill. Last Seen: Last Seen Department: 8/12/2021  Last Seen by PCP: 8/12/2021    Last Written: 10-21-21 90 with 3     If no future appointment scheduled, route STAFF MESSAGE with patient name to the [facility] for scheduling. Next Appointment:   No future appointments. Message sent to [employer] to schedule appt with patient?   YES      Requested Prescriptions     Pending Prescriptions Disp Refills    DULoxetine (CYMBALTA) 20 MG extended release capsule [Pharmacy Med Name: DULoxetine HCl Oral Capsule Delayed Release Particles 20 MG] 90 capsule 1     Sig: TAKE 1 CAPSULE BY MOUTH EVERY DAY

## 2022-09-15 NOTE — TELEPHONE ENCOUNTER
Patient scheduled 10-06-22 and would like to know if we can d labs for hormone levels at this time or if she needs to see a gynecologist for this. Please advise.

## 2022-09-16 NOTE — TELEPHONE ENCOUNTER
I can check thyroid hormones and follicle stimulating hormones for menopause but all other hormones should be done through her gynecologist